# Patient Record
Sex: MALE
[De-identification: names, ages, dates, MRNs, and addresses within clinical notes are randomized per-mention and may not be internally consistent; named-entity substitution may affect disease eponyms.]

---

## 2021-03-15 PROBLEM — Z00.00 ENCOUNTER FOR PREVENTIVE HEALTH EXAMINATION: Status: ACTIVE | Noted: 2021-03-15

## 2021-03-16 ENCOUNTER — APPOINTMENT (OUTPATIENT)
Dept: OTOLARYNGOLOGY | Facility: CLINIC | Age: 41
End: 2021-03-16
Payer: COMMERCIAL

## 2021-03-16 VITALS — HEIGHT: 74 IN | BODY MASS INDEX: 25.03 KG/M2 | WEIGHT: 195 LBS

## 2021-03-16 PROCEDURE — 99024 POSTOP FOLLOW-UP VISIT: CPT

## 2021-03-16 NOTE — HISTORY OF PRESENT ILLNESS
[de-identified] : PIPPA HOU Was seen on March 16 with complaints of cerumen impactions. He denies otalgia or otorrhea.The patient had no other ear nose or throat complaints at this visit.

## 2021-03-16 NOTE — ASSESSMENT
[FreeTextEntry1] : It was my impression that the patient had a cerumen impaction that was cleared.  I recommended topical moisturizing.  I recommended avoiding Q-tips.  I reviewed aural hygiene and the role of cerumen with the patient.  I suggested a repeat visit in a year or earlier should the need arise.

## 2021-08-24 ENCOUNTER — APPOINTMENT (OUTPATIENT)
Dept: NEUROLOGY | Facility: CLINIC | Age: 41
End: 2021-08-24
Payer: COMMERCIAL

## 2021-08-24 VITALS
TEMPERATURE: 97.7 F | RESPIRATION RATE: 16 BRPM | SYSTOLIC BLOOD PRESSURE: 115 MMHG | BODY MASS INDEX: 24 KG/M2 | DIASTOLIC BLOOD PRESSURE: 71 MMHG | WEIGHT: 187 LBS | OXYGEN SATURATION: 98 % | HEART RATE: 78 BPM | HEIGHT: 74 IN

## 2021-08-24 DIAGNOSIS — R20.8 OTHER DISTURBANCES OF SKIN SENSATION: ICD-10-CM

## 2021-08-24 DIAGNOSIS — G95.9 DISEASE OF SPINAL CORD, UNSPECIFIED: ICD-10-CM

## 2021-08-24 DIAGNOSIS — M54.16 RADICULOPATHY, LUMBAR REGION: ICD-10-CM

## 2021-08-24 PROCEDURE — 99205 OFFICE O/P NEW HI 60 MIN: CPT

## 2021-08-24 PROCEDURE — 95910 NRV CNDJ TEST 7-8 STUDIES: CPT

## 2021-08-24 PROCEDURE — 95886 MUSC TEST DONE W/N TEST COMP: CPT

## 2021-08-24 PROCEDURE — 95885 MUSC TST DONE W/NERV TST LIM: CPT | Mod: 59

## 2021-08-24 NOTE — PROCEDURE
[FreeTextEntry1] : \par Nerve Conduction and Electromyography Report [FreeTextEntry3] : \par Electro Physiologic Findings:\par \par Limb temperature was monitored and maintained at approximately 30 – 34° C in the lower extremities. \par \par The left sural and bilateral superficial fibular sensory responses were normal. The right fibular motor amplitude was mildly low, and there was slowing across the fibular head with about 40% conduction block. The left fibular motor response was normal. The tibial motor amplitudes were normal bilaterally and symmetric. The right fibular F-wave latencies were prolonged, while the left fibular and bilateral tibial F-wave latencies were normal. \par \par Needle electromyography was performed on select bilateral lower extremity appendicular muscles and the bilateral L2/3 and L3/4 paraspinals. There was mild chronic denervation in the vastus lateralis muscles bilaterally. All other muscles tested were unremarkable. 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 \par \par There were also some mild abnormalities in the quardiceps muscles bilaterally that may represent chronic bilateral mid-lumbar radiculopathies. MRI of the lumbar spine may be considered for further workup.

## 2021-08-25 PROBLEM — R20.8 BURNING SENSATION: Status: ACTIVE | Noted: 2021-08-25

## 2021-08-25 PROBLEM — G95.9 CERVICAL MYELOPATHY: Status: ACTIVE | Noted: 2021-08-25

## 2021-08-25 PROBLEM — M54.16 BILATERAL LUMBAR RADICULOPATHY: Status: ACTIVE | Noted: 2021-08-25

## 2021-08-25 NOTE — HISTORY OF PRESENT ILLNESS
[FreeTextEntry1] : Referred by Dr. Sanchez for evaluation of paresthesias \par Symptoms started about a year ago with a burning sensation in the feet\par Then he noticed numbness in the right lower leg, and subsequently it spread throughout his body - would occur in the arms, face, head, back\par He went to the ER a few times, and evaluation was unremarkable \par He also complains of "pressure in the head" \par He started taking ativan which has helped with sensory symptoms \par He is also taking cymbalta and gabapentin which he thinks are helping somewhat but not as much as ativan \par About 2 weeks ago he started having muscle twitching in the legs, less so in other places in his body \par Currently he still has numbness and burning in his arms and thighs\par \par Reviewed:\par d/c summary from ER\par MRI brain - normal\par Labs - CBC, CMP normal

## 2021-08-25 NOTE — ASSESSMENT
[FreeTextEntry1] : Etiology of symptoms is unclear \par No large fiber polyneuropathy seen on NCS/EMG\par There was chronic denervation in the quadriceps that may suggest chronic b/l mid-lumbar radiculopathy \par He's also hyperreflexic which may indicate a cervical myelopathy\par Recommend MRIs of the cervical and lumbar spine for further evaluation\par \par A small fiber neuropathy is possible; if workup for diabetes, HIV, and rheumatologic conditions is negative, then this would likely be idiopathic \par \par See separate procedure note for full results of NCS/EMG study

## 2021-08-25 NOTE — CONSULT LETTER
[Dear  ___] : Dear  [unfilled], [Consult Letter:] : I had the pleasure of evaluating your patient, [unfilled]. [Please see my note below.] : Please see my note below. [Consult Closing:] : Thank you very much for allowing me to participate in the care of this patient.  If you have any questions, please do not hesitate to contact me. [Sincerely,] : Sincerely, [FreeTextEntry3] : Mohan William M.D.\par Neurology, Electromyography and Neuromuscular Medicine\par [facility]\par \par  of Neurology\par [facility] [employer]

## 2021-08-25 NOTE — CONSULT LETTER
[Dear  ___] : Dear  [unfilled], [Consult Letter:] : I had the pleasure of evaluating your patient, [unfilled]. [Please see my note below.] : Please see my note below. [Consult Closing:] : Thank you very much for allowing me to participate in the care of this patient.  If you have any questions, please do not hesitate to contact me. [Sincerely,] : Sincerely, [FreeTextEntry3] : Mohan William M.D.\par Neurology, Electromyography and Neuromuscular Medicine\par [facility]\par \par  of Neurology\par [facility] [employer] [facility]

## 2021-08-25 NOTE — PHYSICAL EXAM
[FreeTextEntry1] : Motor: 5/5 symmetric strength throughout\par Sensory: LT/PP/Vibration sense intact and symmetric b/l; cold sensation reduced in length-dependent manner up to knees b/l\par Reflexes: 3+ symmetric throughout, + Bee's sign, plantar responses equivocal b/l\par Gait: normal

## 2021-10-05 ENCOUNTER — NON-APPOINTMENT (OUTPATIENT)
Age: 41
End: 2021-10-05

## 2023-01-24 ENCOUNTER — NON-APPOINTMENT (OUTPATIENT)
Age: 43
End: 2023-01-24

## 2023-01-25 ENCOUNTER — APPOINTMENT (OUTPATIENT)
Dept: OTOLARYNGOLOGY | Facility: CLINIC | Age: 43
End: 2023-01-25
Payer: COMMERCIAL

## 2023-01-25 VITALS — HEIGHT: 74 IN | BODY MASS INDEX: 24 KG/M2 | WEIGHT: 187 LBS

## 2023-01-25 DIAGNOSIS — H61.22 IMPACTED CERUMEN, LEFT EAR: ICD-10-CM

## 2023-01-25 PROCEDURE — 99213 OFFICE O/P EST LOW 20 MIN: CPT | Mod: 25

## 2023-01-25 PROCEDURE — 69210 REMOVE IMPACTED EAR WAX UNI: CPT

## 2023-01-25 NOTE — PHYSICAL EXAM
[FreeTextEntry1] : \par The patient was alert and oriented and in no distress.\par Voice was clear.\par \par Face:\par The patient had no facial asymmetry or mass.\par The skin was unremarkable.\par \par Eyes:\par The pupils were equal round and reactive to light and accommodation.\par There was no significant nystagmus or disconjugate gaze noted.\par \par Nose: \par The external nose had no significant deformity.  There was no facial tenderness.  On anterior rhinoscopy, the nasal mucosa was clear.  The anterior septum was midline.  There were no visualized polyps purulence  or masses.\par \par Oral cavity:\par The oral mucosa was normal.\par The oral and base of tongue were clear and without mass.\par The gingival and buccal mucosa were moist and without lesions.\par The palate moved well.\par There was no cleft to the palate.\par There appeared to be good salivary flow.  \par There was no pus, erythema or mass in the oral cavity.\par \par \par Ears:\par Procedure Note\par Removal of Cerumen- right ear   cpt 43142\par Dx:  Symptomatic cerument impaction- right ear\par Both external ears were normal.\par There was a dense cerumen impaction in the right ear.  This was cleared microscopically using suction and curettes without trauma.  Beyond that, both ear canals were clear both eardrums were intact and mobile.\par \par Neck: \par The neck was symmetrical.\par The parotid and submandibular glands were normal without masses.\par The trachea was midline and there was no unusual crepitus.\par The thyroid was smooth and nontender and no masses were palpated.\par There was no significant cervical adenopathy.\par \par \par Neuro:\par Neurologically, the patient was awake, alert, and oriented to person, place and time. There were no obvious focal neurologic abnormalities.  Cranial nerves II through XII were grossly intact.\par \par \par TMJ:\par The temporomandibular joints were nontender.\par There was no abnormal crepitus and no significant malocclusion\par

## 2023-01-25 NOTE — HISTORY OF PRESENT ILLNESS
[de-identified] : PIPPA HOU is a 43 year old male who comes in complaining of a cerumen impaction in the right ear.  He denies otalgia otorrhea or vertigo.  The patient had no other ear nose or throat complaints at this visit.

## 2023-01-25 NOTE — CONSULT LETTER
[FreeTextEntry2] : KYARA GERARDO\par  [FreeTextEntry1] : \par \par Dear  Dr. KYARA GERARDO,\par \par I had the pleasure of seeing your patient today.  \par Please see my note below.\par \par \par Thank you very much for allowing me to participate in the care of your patient.\par \par Sincerely,\par \par \par Rafat Kwok MD\par [facility]\par [facility]\par  [facility]\par \par

## 2023-03-29 NOTE — HISTORY OF PRESENT ILLNESS
[FreeTextEntry1] : Referred by Dr. Sanchez for evaluation of paresthesias \par Symptoms started about a year ago with a burning sensation in the feet\par Then he noticed numbness in the right lower leg, and subsequently it spread throughout his body - would occur in the arms, face, head, back\par He went to the ER a few times, and evaluation was unremarkable \par He also complains of "pressure in the head" \par He started taking ativan which has helped with sensory symptoms \par He is also taking cymbalta and gabapentin which he thinks are helping somewhat but not as much as ativan \par About 2 weeks ago he started having muscle twitching in the legs, less so in other places in his body \par Currently he still has numbness and burning in his arms and thighs\par \par Reviewed:\par d/c summary from ER\par MRI brain - normal\par Labs - CBC, CMP normal  [[id number]],[[id number]]

## 2023-11-02 ENCOUNTER — TRANSCRIPTION ENCOUNTER (OUTPATIENT)
Age: 43
End: 2023-11-02

## 2025-03-08 ENCOUNTER — NON-APPOINTMENT (OUTPATIENT)
Age: 45
End: 2025-03-08

## 2025-03-25 ENCOUNTER — APPOINTMENT (OUTPATIENT)
Dept: OTOLARYNGOLOGY | Facility: CLINIC | Age: 45
End: 2025-03-25
Payer: COMMERCIAL

## 2025-03-25 DIAGNOSIS — H61.22 IMPACTED CERUMEN, LEFT EAR: ICD-10-CM

## 2025-03-25 PROCEDURE — 99024 POSTOP FOLLOW-UP VISIT: CPT
